# Patient Record
Sex: MALE | Race: BLACK OR AFRICAN AMERICAN | Employment: PART TIME | URBAN - METROPOLITAN AREA
[De-identification: names, ages, dates, MRNs, and addresses within clinical notes are randomized per-mention and may not be internally consistent; named-entity substitution may affect disease eponyms.]

---
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
## 2023-01-06 ENCOUNTER — OFFICE VISIT (OUTPATIENT)
Dept: PRIMARY CARE CLINIC | Age: 23
End: 2023-01-06

## 2023-01-06 VITALS
DIASTOLIC BLOOD PRESSURE: 77 MMHG | TEMPERATURE: 98.1 F | BODY MASS INDEX: 22.51 KG/M2 | WEIGHT: 160.8 LBS | HEIGHT: 71 IN | HEART RATE: 63 BPM | SYSTOLIC BLOOD PRESSURE: 124 MMHG

## 2023-01-06 DIAGNOSIS — Z11.59 NEED FOR HEPATITIS C SCREENING TEST: ICD-10-CM

## 2023-01-06 DIAGNOSIS — Z23 NEED FOR VACCINATION: ICD-10-CM

## 2023-01-06 DIAGNOSIS — M21.42 BILATERAL PES PLANUS: ICD-10-CM

## 2023-01-06 DIAGNOSIS — M21.41 BILATERAL PES PLANUS: ICD-10-CM

## 2023-01-06 DIAGNOSIS — Z00.00 ENCOUNTER FOR WELL ADULT EXAM WITHOUT ABNORMAL FINDINGS: Primary | ICD-10-CM

## 2023-01-06 DIAGNOSIS — Z11.59 NEED FOR HEPATITIS B SCREENING TEST: ICD-10-CM

## 2023-01-06 DIAGNOSIS — Z71.84 TRAVEL ADVICE ENCOUNTER: ICD-10-CM

## 2023-01-06 DIAGNOSIS — Z72.89 OTHER PROBLEMS RELATED TO LIFESTYLE: ICD-10-CM

## 2023-01-06 PROBLEM — S93.491A SPRAIN OF ANTERIOR TALOFIBULAR LIGAMENT OF RIGHT ANKLE: Status: RESOLVED | Noted: 2021-10-21 | Resolved: 2023-01-06

## 2023-01-06 LAB
HBV SURFACE AB TITR SER: <3.5 MIU/ML
HEPATITIS C ANTIBODY INTERPRETATION: NORMAL

## 2023-01-06 ASSESSMENT — ENCOUNTER SYMPTOMS
ABDOMINAL DISTENTION: 0
EYE ITCHING: 0
CHEST TIGHTNESS: 0
TROUBLE SWALLOWING: 0
DIARRHEA: 0
CONSTIPATION: 0
EYE DISCHARGE: 0
SORE THROAT: 0
BACK PAIN: 0
CHOKING: 0
ABDOMINAL PAIN: 0
SINUS PAIN: 0
RHINORRHEA: 0

## 2023-01-06 ASSESSMENT — PATIENT HEALTH QUESTIONNAIRE - PHQ9
1. LITTLE INTEREST OR PLEASURE IN DOING THINGS: 0
SUM OF ALL RESPONSES TO PHQ QUESTIONS 1-9: 6
SUM OF ALL RESPONSES TO PHQ QUESTIONS 1-9: 6
7. TROUBLE CONCENTRATING ON THINGS, SUCH AS READING THE NEWSPAPER OR WATCHING TELEVISION: 0
10. IF YOU CHECKED OFF ANY PROBLEMS, HOW DIFFICULT HAVE THESE PROBLEMS MADE IT FOR YOU TO DO YOUR WORK, TAKE CARE OF THINGS AT HOME, OR GET ALONG WITH OTHER PEOPLE: NOT DIFFICULT AT ALL
SUM OF ALL RESPONSES TO PHQ QUESTIONS 1-9: 6
9. THOUGHTS THAT YOU WOULD BE BETTER OFF DEAD, OR OF HURTING YOURSELF: 0
4. FEELING TIRED OR HAVING LITTLE ENERGY: 0
3. TROUBLE FALLING OR STAYING ASLEEP: 2
2. FEELING DOWN, DEPRESSED OR HOPELESS: 1
6. FEELING BAD ABOUT YOURSELF - OR THAT YOU ARE A FAILURE OR HAVE LET YOURSELF OR YOUR FAMILY DOWN: 1
SUM OF ALL RESPONSES TO PHQ9 QUESTIONS 1 & 2: 1
8. MOVING OR SPEAKING SO SLOWLY THAT OTHER PEOPLE COULD HAVE NOTICED. OR THE OPPOSITE, BEING SO FIGETY OR RESTLESS THAT YOU HAVE BEEN MOVING AROUND A LOT MORE THAN USUAL: 1
SUM OF ALL RESPONSES TO PHQ QUESTIONS 1-9: 6
5. POOR APPETITE OR OVEREATING: 1

## 2023-01-06 ASSESSMENT — PATIENT HEALTH QUESTIONNAIRE - GENERAL
IN THE PAST YEAR HAVE YOU FELT DEPRESSED OR SAD MOST DAYS, EVEN IF YOU FELT OKAY SOMETIMES?: NO
HAVE YOU EVER, IN YOUR WHOLE LIFE, TRIED TO KILL YOURSELF OR MADE A SUICIDE ATTEMPT?: NO
HAS THERE BEEN A TIME IN THE PAST MONTH WHEN YOU HAVE HAD SERIOUS THOUGHTS ABOUT ENDING YOUR LIFE?: NO

## 2023-01-06 NOTE — PATIENT INSTRUCTIONS
Your immunizations are up to date. We are checking to see if you have enough antibodies to hepatitis B. Try to get a typhoid vaccine before you travel:    Heart-Healthy Diet   Sodium, Fat, and Cholesterol Controlled Diet       What Is a Heart Healthy Diet? A heart-healthy diet is one that limits sodium , certain types of fat , and cholesterol . This type of diet is recommended for:   People with any form of cardiovascular disease (eg, coronary heart disease , peripheral vascular disease , previous heart attack , previous stroke )   People with risk factors for cardiovascular disease, such as high blood pressure , high cholesterol , or diabetes   Anyone who wants to lower their risk of developing cardiovascular disease   Sodium    Sodium is a mineral found in many foods. In general, most people consume much more sodium than they need. Diets high in sodium can increase blood pressure and lead to edema (water retention). On a heart-healthy diet, you should consume no more than 2,300 mg (milligrams) of sodium per dayabout the amount in one teaspoon of table salt. The foods highest in sodium include table salt (about 50% sodium), processed foods, convenience foods, and preserved foods. Cholesterol    Cholesterol is a fat-like, waxy substance in your blood. Our bodies make some cholesterol. It is also found in animal products, with the highest amounts in fatty meat, egg yolks, whole milk, cheese, shellfish, and organ meats. On a heart-healthy diet, you should limit your cholesterol intake to less than 200 mg per day. It is normal and important to have some cholesterol in your bloodstream. But too much cholesterol can cause plaque to build up within your arteries, which can eventually lead to a heart attack or stroke.    The two types of cholesterol that are most commonly referred to are:   Low-density lipoprotein (LDL) cholesterol  Also known as bad cholesterol, this is the cholesterol that tends to build up along your arteries. Bad cholesterol levels are increased by eating fats that are saturated or hydrogenated. Optimal level of this cholesterol is less than 100. Over 130 starts to get risky for heart disease. High-density lipoprotein (HDL) cholesterol  Also known as good cholesterol, this type of cholesterol actually carries cholesterol away from your arteries and may, therefore, help lower your risk of having a heart attack. You want this level to be high (ideally greater than 60). It is a risk to have a level less than 40. You can raise this good cholesterol by eating olive oil, canola oil, avocados, or nuts. Exercise raises this level, too. Fat    Fat is calorie dense and packs a lot of calories into a small amount of food. Even though fats should be limited due to their high calorie content, not all fats are bad. In fact, some fats are quite healthful. Fat can be broken down into four main types.    The good-for-you fats are:   Monounsaturated fat  found in oils such as olive and canola, avocados, and nuts and natural nut butters; can decrease cholesterol levels, while keeping levels of HDL cholesterol high   Polyunsaturated fat  found in oils such as safflower, sunflower, soybean, corn, and sesame; can decrease total cholesterol and LDL cholesterol   Omega-3 fatty acids  particularly those found in fatty fish (such as salmon, trout, tuna, mackerel, herring, and sardines); can decrease risk of arrhythmias, decrease triglyceride levels, and slightly lower blood pressure   The fats that you want to limit are:   Saturated fat  found in animal products, many fast foods, and a few vegetables; increases total blood cholesterol, including LDL levels   Animal fats that are saturated include: butter, lard, whole-milk dairy products, meat fat, and poultry skin   Vegetable fats that are saturated include: hydrogenated shortening, palm oil, coconut oil, cocoa butter   Hydrogenated or trans fat  found in margarine and vegetable shortening, most shelf stable snack foods, and fried foods; increases LDL and decreases HDL     It is generally recommended that you limit your total fat for the day to less than 30% of your total calories. If you follow an 1800-calorie heart healthy diet, for example, this would mean 60 grams of fat or less per day. Saturated fat and trans fat in your diet raises your blood cholesterol the most, much more than dietary cholesterol does. For this reason, on a heart-healthy diet, less than 7% of your calories should come from saturated fat and ideally 0% from trans fat. On an 1800-calorie diet, this translates into less than 14 grams of saturated fat per day, leaving 46 grams of fat to come from mono- and polyunsaturated fats.    Food Choices on a Heart Healthy Diet   Food Category   Foods Recommended   Foods to Avoid   Grains   Breads and rolls without salted tops Most dry and cooked cereals Unsalted crackers and breadsticks Low-sodium or homemade breadcrumbs or stuffing All rice and pastas   Breads, rolls, and crackers with salted tops High-fat baked goods (eg, muffins, donuts, pastries) Quick breads, self-rising flour, and biscuit mixes Regular bread crumbs Instant hot cereals Commercially prepared rice, pasta, or stuffing mixes   Vegetables   Most fresh, frozen, and low-sodium canned vegetables Low-sodium and salt-free vegetable juices Canned vegetables if unsalted or rinsed   Regular canned vegetables and juices, including sauerkraut and pickled vegetables Frozen vegetables with sauces Commercially prepared potato and vegetable mixes   Fruits   Most fresh, frozen, and canned fruits All fruit juices   Fruits processed with salt or sodium   Milk   Nonfat or low-fat (1%) milk Nonfat or low-fat yogurt Cottage cheese, low-fat ricotta, cheeses labeled as low-fat and low-sodium   Whole milk Reduced-fat (2%) milk Malted and chocolate milk Full fat yogurt Most cheeses (unless low-fat and low salt) Buttermilk (no more than 1 cup per week)   Meats and Beans   Lean cuts of fresh or frozen beef, veal, lamb, or pork (look for the word loin) Fresh or frozen poultry without the skin Fresh or frozen fish and some shellfish Egg whites and egg substitutes (Limit whole eggs to three per week) Tofu Nuts or seeds (unsalted, dry-roasted), low-sodium peanut butter Dried peas, beans, and lentils   Any smoked, cured, salted, or canned meat, fish, or poultry (including sheets, chipped beef, cold cuts, hot dogs, sausages, sardines, and anchovies) Poultry skins Breaded and/or fried fish or meats Canned peas, beans, and lentils Salted nuts   Fats and Oils   Olive oil and canola oil Low-sodium, low-fat salad dressings and mayonnaise   Butter, margarine, coconut and palm oils, sheets fat   Snacks, Sweets, and Condiments   Low-sodium or unsalted versions of broths, soups, soy sauce, and condiments Pepper, herbs, and spices; vinegar, lemon, or lime juice Low-fat frozen desserts (yogurt, sherbet, fruit bars) Sugar, cocoa powder, honey, syrup, jam, and preserves Low-fat, trans-fat free cookies, cakes, and pies Clayton and animal crackers, fig bars, jayesh snaps   High-fat desserts Broth, soups, gravies, and sauces, made from instant mixes or other high-sodium ingredients Salted snack foods Canned olives Meat tenderizers, seasoning salt, and most flavored vinegars   Beverages   Low-sodium carbonated beverages Tea and coffee in moderation Soy milk   Commercially softened water   Suggestions   Make whole grains, fruits, and vegetables the base of your diet. Choose heart-healthy fats such as canola, olive, and flaxseed oil, and foods high in heart-healthy fats, such as nuts, seeds, soybeans, tofu, and fish. Eat fish at least twice per week; the fish highest in omega-3 fatty acids and lowest in mercury include salmon, herring, mackerel, sardines, and canned chunk light tuna.  If you eat fish less than twice per week or have high triglycerides, talk to your doctor about taking fish oil supplements. Read food labels. For products low in fat and cholesterol, look for fat free, low-fat, cholesterol free, saturated fat free, and trans fat freeAlso scan the Nutrition Facts Label, which lists saturated fat, trans fat, and cholesterol amounts. For products low in sodium, look for sodium free, very low sodium, low sodium, no added salt, and unsalted   Skip the salt when cooking or at the table; if food needs more flavor, get creative and try out different herbs and spices. Garlic and onion also add substantial flavor to foods. Trim any visible fat off meat and poultry before cooking, and drain the fat off after gutierrez. Use cooking methods that require little or no added fat, such as grilling, boiling, baking, poaching, broiling, roasting, steaming, stir-frying, and sauting. Avoid fast food and convenience food. They tend to be high in saturated and trans fat and have a lot of added salt. Talk to a registered dietitian for individualized diet advice.       Last Reviewed: March 2011 Markell Simpson MS, MPH, RD   Updated: 3/29/2011

## 2023-01-06 NOTE — LETTER
[facility]  [address] [address]  Phone: [phone]    Reinier Spangler MD    January 6, 2023     Patient: Tuan Ann   YOB: 2000   Date of Visit: 1/6/2023       Immunization History   Administered Date(s) Administered    COVID-19, MODERNA Bivalent BOOSTER, (age 12y+), IM, 48 mcg/0.5 mL 11/19/2022    COVID-19, PFIZER PURPLE top, DILUTE for use, (age 15 y+), 30mcg/0.3mL 04/08/2021, 04/29/2021    DTaP 2002, 03/01/2003, 04/18/2003, 07/15/2004, 06/18/2007    HPV Quadrivalent (Gardasil) 08/07/2014, 10/09/2015    Hepatitis A 08/24/2006, 06/18/2007    Hepatitis B 2000, 2000, 12/06/2003    Hib (HbOC) 2002, 03/01/2003, 04/18/2003, 12/06/2003    Influenza A (T1O9-91) Vaccine Nasal 11/10/2009, 12/14/2009    Influenza Nasal 02/02/2011, 10/04/2011, 11/12/2012    Influenza Virus Vaccine 11/19/2022    Influenza, AFLURIA (age 1 yrs+), FLUZONE, (age 10 mo+), MDV, 0.5mL 10/17/2016, 10/13/2017    Influenza, FLUBLOK, (age 25 y+), PF, 0.5mL 10/23/2020    Influenza, FLUMIST, (age 2-51 y), Live, Intranasal, 0.2mL 11/12/2013, 10/10/2014, 10/09/2015    MMR 12/06/2003, 06/18/2007    Meningococcal B, OMV (Bexsero) 01/06/2023    Meningococcal B, Recombinant Martinez Laser) 07/18/2019    Meningococcal MCV4P (Menactra) 11/12/2013, 07/18/2019    Pneumococcal Conjugate 7-valent (Sammye Grigsby) 2002, 03/01/2003, 12/06/2003    Polio IPV (IPOL) 2002, 03/01/2003, 07/15/2004, 06/18/2007    Tdap (Boostrix, Adacel) 11/12/2013, 01/06/2023    Varicella (Varivax) 12/06/2003, 07/14/2008

## 2023-01-06 NOTE — PROGRESS NOTES
Well Adult Note  Name: Emmanuel Johns Date: 2023   MRN: [id number] Sex: Male   Age: 23 y.o. Ethnicity: Non- / Non    : 2000 Race: Black / Landon Bee is here for well adult exam. This is his first visit with our primary care office since 2019. 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 He did PT but has not done recent stretching or strengthening of ankles. Other than that, he has not had recent LE pain or injury. He says he feels a little unsteady when he comes down from a jump    History:  Past history reviewed  He has no health risk factors. Diet is generally healthy      Review of Systems   Constitutional:  Negative for fatigue and fever. HENT:  Negative for congestion, dental problem, ear pain, nosebleeds, rhinorrhea, sinus pain, sore throat and trouble swallowing. Eyes:  Negative for discharge and itching. Respiratory:  Negative for choking and chest tightness. Cardiovascular:  Negative for chest pain, palpitations and leg swelling. Gastrointestinal:  Negative for abdominal distention, abdominal pain, constipation and diarrhea. Genitourinary:  Negative for dysuria, frequency, hematuria, penile discharge, scrotal swelling and testicular pain. Musculoskeletal:  Negative for arthralgias, back pain, gait problem, joint swelling, myalgias, neck pain and neck stiffness. Skin:  Negative for rash. Allergic/Immunologic: Negative for environmental allergies and food allergies. Neurological:  Negative for dizziness, syncope and headaches. Hematological:  Negative for adenopathy. Does not bruise/bleed easily.    Psychiatric/Behavioral:  Negative for decreased concentration, hallucinations, sleep disturbance and suicidal ideas. The patient is not nervous/anxious. No Known Allergies    No current outpatient medications on file prior to visit. No current facility-administered medications on file prior to visit. Past Medical History:   Diagnosis Date    Open wound of forehead 07/02/2019    Sprain of anterior talofibular ligament of right ankle 10/21/2021       No past surgical history on file. Family History   Problem Relation Age of Onset    Asthma Mother     Asthma Brother     Cancer Paternal Grandmother     Diabetes Other        Social History     Tobacco Use    Smoking status: Never    Smokeless tobacco: Never   Vaping Use    Vaping Use: Never used   Substance Use Topics    Alcohol use: Never    Drug use: Never       Objective   /77 (Site: Right Upper Arm, Position: Sitting, Cuff Size: Medium Adult)   Pulse 63   Temp 98.1 °F (36.7 °C) (Infrared)   Ht 5' 10.5\" (1.791 m)   Wt 160 lb 12.8 oz (72.9 kg)   BMI 22.75 kg/m²   Wt Readings from Last 3 Encounters:   01/06/23 160 lb 12.8 oz (72.9 kg)   11/11/21 150 lb (68 kg) (45 %, Z= -0.11)*   10/21/21 150 lb (68 kg) (46 %, Z= -0.11)*     * Growth percentiles are based on CDC (Boys, 2-20 Years) data. There were no vitals filed for this visit. Physical Exam  Vitals reviewed. Constitutional:       General: He is not in acute distress. Appearance: Normal appearance. He is well-developed and normal weight. HENT:      Head: Normocephalic and atraumatic. Right Ear: External ear normal.      Left Ear: External ear normal.      Nose: Nose normal.      Mouth/Throat:      Pharynx: No oropharyngeal exudate. Eyes:      General: No scleral icterus. Right eye: No discharge. Left eye: No discharge. Conjunctiva/sclera: Conjunctivae normal.      Pupils: Pupils are equal, round, and reactive to light. Neck:      Thyroid: No thyromegaly. Trachea: No tracheal deviation.    Cardiovascular:      Rate and Rhythm: Normal rate and regular rhythm. Heart sounds: Normal heart sounds. No murmur heard. No friction rub. No gallop. Pulmonary:      Effort: Pulmonary effort is normal.      Breath sounds: Normal breath sounds. No wheezing or rales. Abdominal:      General: Bowel sounds are normal. There is no distension. Palpations: Abdomen is soft. There is no mass. Tenderness: There is no abdominal tenderness. There is no guarding or rebound. Hernia: No hernia is present. There is no hernia in the left inguinal area or right inguinal area. Genitourinary:     Comments: Not examined  Musculoskeletal:         General: Normal range of motion. Cervical back: Normal range of motion and neck supple. Comments: There is normal ROM of the ankle joint bilaterally. There is no pain or instability. He has bilat pes planus   Lymphadenopathy:      Cervical: No cervical adenopathy. Lower Body: No right inguinal adenopathy. No left inguinal adenopathy. Skin:     General: Skin is warm and dry. Capillary Refill: Capillary refill takes less than 2 seconds. Findings: No erythema or rash. Neurological:      Mental Status: He is alert and oriented to person, place, and time. Cranial Nerves: No cranial nerve deficit. Motor: No abnormal muscle tone. Coordination: Coordination normal.   Psychiatric:         Mood and Affect: Mood normal.         Behavior: Behavior normal.         Thought Content: Thought content normal.         Judgment: Judgment normal.       Assessment   Plan   1. Encounter for well adult exam without abnormal findings  Overall, Jaye Antoine is doing very well in academic/social/behavioral areas. He is okay for study abroad program. He has a healthy lifestyle, with no chronic problems  He had normal lipid, HIV, and CBC   2. Other problems related to lifestyle  -     Hepatitis C Antibody- this is a screening test only, as he has no risk factors  3.  Need for hepatitis C screening test  -     Hepatitis C Antibody- see above  4. BMI 22.0-22.9, adult  He has very little body fat, consistent with his athletic status, could eat more  5. Bilateral pes planus  Ordinarily this should cause no problem, but due to possible ankle instability I have given Nosagie information about preventing foot problems and ankle strengthening exercises  6. Need for vaccination  Nosagie had influenza and covid vaccinations elsewhere. I have updated the following vaccines:  -     Tdap, BOOSTRIX, (age 8 yrs+), IM  -     Meningococcal Shanel Downy, (age 6y-22y), IM  7. Need for hepatitis B screening test  -     Hepatitis B Surface Antibody - may need hep B booster before travel if titer is low  8. Travel advice encounter  In addition to the other vaccines, he needs typhoid vaccine acc to CDC travel guidelines  Our staff confirmed that [facility] [address] in [place] has the oral typhoid vaccine, so Rx was sent there and Nosagie confirmed that he will pick this up before he leaves. -     typhoid vaccine (VIVOTIF) delayed release capsule; Take one capsule by mouth immediately.  Repeat dose in 48 hours, Disp-2 capsule, R-0Normal         Personalized Preventive Plan   Current Health Maintenance Status  Immunization History   Administered Date(s) Administered    COVID-19, MODERNA Bivalent BOOSTER, (age 12y+), IM, 48 mcg/0.5 mL 11/19/2022    COVID-19, PFIZER PURPLE top, DILUTE for use, (age 15 y+), 30mcg/0.3mL 04/08/2021, 04/29/2021    DTaP 2002, 03/01/2003, 04/18/2003, 07/15/2004, 06/18/2007    HPV Quadrivalent (Gardasil) 08/07/2014, 10/09/2015    Hepatitis A 08/24/2006, 06/18/2007    Hepatitis B 2002, 2002, 12/06/2003    Hib (HbOC) 2002, 03/01/2003, 04/18/2003, 12/06/2003    Influenza A (M4E2-68) Vaccine Nasal 11/10/2009, 12/14/2009    Influenza Nasal 02/02/2011, 10/04/2011, 11/12/2012    Influenza Virus Vaccine 11/19/2022    Influenza, AFLURIA (age 1 yrs+), FLUZONE, (age 10 mo+), MDV, 0.5mL 10/17/2016, 10/13/2017    Influenza, FLUBLOK, (age 25 y+), PF, 0.5mL 10/23/2020    Influenza, FLUMIST, (age 2-51 y), Live, Intranasal, 0.2mL 11/12/2013, 10/10/2014, 10/09/2015    MMR 12/06/2003, 06/18/2007    Meningococcal B, Recombinant Prvain Boyd) 07/18/2019    Meningococcal MCV4P (Menactra) 11/12/2013, 07/18/2019    Pneumococcal Conjugate 7-valent (Joanette Gisela) 2002, 03/01/2003, 12/06/2003    Polio IPV (IPOL) 2002, 03/01/2003, 07/15/2004, 06/18/2007    Tdap (Boostrix, Adacel) 11/12/2013    Varicella (Varivax) 12/06/2003, 07/14/2008      This is current state of  items due:  Health Maintenance   Topic Date Due    Depression Screen  Never done    Hepatitis C screen  Never done    DTaP/Tdap/Td vaccine (8 - Td or Tdap) 01/06/2033    Hepatitis A vaccine  Completed    Hib vaccine  Completed    HPV vaccine  Completed    Varicella vaccine  Completed    Meningococcal (ACWY) vaccine  Completed    Flu vaccine  Completed    COVID-19 Vaccine  Completed    HIV screen  Completed    Pneumococcal 0-64 years Vaccine  Aged Out     Recommendations for Preventive Services Due: see orders and patient instructions/AVS.    No follow-ups on file.

## 2023-01-07 ENCOUNTER — TELEPHONE (OUTPATIENT)
Dept: PRIMARY CARE CLINIC | Age: 23
End: 2023-01-07

## 2023-01-07 NOTE — PROGRESS NOTES
Well Adult Note  Name: Jose Fitzpatrick Date: 2023   MRN: [id number] Sex: Male   Age: 23 y.o. Ethnicity: Non- / Non    : 2000 Race: Black / Sophia Megan is here for well adult exam. This is his first visit with our primary care office since 2019. 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 He did PT but has not done recent stretching or strengthening of ankles. Other than that, he has not had recent LE pain or injury. He says he feels a little unsteady when he comes down from a jump    History:  Past history reviewed  He has no health risk factors. Diet is generally healthy      Review of Systems   Constitutional:  Negative for fatigue and fever. HENT:  Negative for congestion, dental problem, ear pain, nosebleeds, rhinorrhea, sinus pain, sore throat and trouble swallowing. Eyes:  Negative for discharge and itching. Respiratory:  Negative for choking and chest tightness. Cardiovascular:  Negative for chest pain, palpitations and leg swelling. Gastrointestinal:  Negative for abdominal distention, abdominal pain, constipation and diarrhea. Genitourinary:  Negative for dysuria, frequency, hematuria, penile discharge, scrotal swelling and testicular pain. Musculoskeletal:  Negative for arthralgias, back pain, gait problem, joint swelling, myalgias, neck pain and neck stiffness. Skin:  Negative for rash. Allergic/Immunologic: Negative for environmental allergies and food allergies. Neurological:  Negative for dizziness, syncope and headaches. Hematological:  Negative for adenopathy. Does not bruise/bleed easily.    Psychiatric/Behavioral:  Negative for decreased concentration, hallucinations, sleep disturbance and suicidal ideas. The patient is not nervous/anxious. No Known Allergies    No current outpatient medications on file prior to visit. No current facility-administered medications on file prior to visit. Past Medical History:   Diagnosis Date    Open wound of forehead 07/02/2019    Sprain of anterior talofibular ligament of right ankle 10/21/2021       No past surgical history on file. Family History   Problem Relation Age of Onset    Asthma Mother     Asthma Brother     Cancer Paternal Grandmother     Diabetes Other        Social History     Tobacco Use    Smoking status: Never    Smokeless tobacco: Never   Vaping Use    Vaping Use: Never used   Substance Use Topics    Alcohol use: Never    Drug use: Never       Objective   /77 (Site: Right Upper Arm, Position: Sitting, Cuff Size: Medium Adult)   Pulse 63   Temp 98.1 °F (36.7 °C) (Infrared)   Ht 5' 10.5\" (1.791 m)   Wt 160 lb 12.8 oz (72.9 kg)   BMI 22.75 kg/m²   Wt Readings from Last 3 Encounters:   01/06/23 160 lb 12.8 oz (72.9 kg)   11/11/21 150 lb (68 kg) (45 %, Z= -0.11)*   10/21/21 150 lb (68 kg) (46 %, Z= -0.11)*     * Growth percentiles are based on CDC (Boys, 2-20 Years) data. There were no vitals filed for this visit. Physical Exam  Vitals reviewed. Constitutional:       General: He is not in acute distress. Appearance: Normal appearance. He is well-developed and normal weight. HENT:      Head: Normocephalic and atraumatic. Right Ear: External ear normal.      Left Ear: External ear normal.      Nose: Nose normal.      Mouth/Throat:      Pharynx: No oropharyngeal exudate. Eyes:      General: No scleral icterus. Right eye: No discharge. Left eye: No discharge. Conjunctiva/sclera: Conjunctivae normal.      Pupils: Pupils are equal, round, and reactive to light. Neck:      Thyroid: No thyromegaly. Trachea: No tracheal deviation.    Cardiovascular:      Rate and Rhythm: Normal rate and regular rhythm. Heart sounds: Normal heart sounds. No murmur heard. No friction rub. No gallop. Pulmonary:      Effort: Pulmonary effort is normal.      Breath sounds: Normal breath sounds. No wheezing or rales. Abdominal:      General: Bowel sounds are normal. There is no distension. Palpations: Abdomen is soft. There is no mass. Tenderness: There is no abdominal tenderness. There is no guarding or rebound. Hernia: No hernia is present. There is no hernia in the left inguinal area or right inguinal area. Genitourinary:     Comments: Not examined  Musculoskeletal:         General: Normal range of motion. Cervical back: Normal range of motion and neck supple. Comments: There is normal ROM of the ankle joint bilaterally. There is no pain or instability. He has bilat pes planus   Lymphadenopathy:      Cervical: No cervical adenopathy. Lower Body: No right inguinal adenopathy. No left inguinal adenopathy. Skin:     General: Skin is warm and dry. Capillary Refill: Capillary refill takes less than 2 seconds. Findings: No erythema or rash. Neurological:      Mental Status: He is alert and oriented to person, place, and time. Cranial Nerves: No cranial nerve deficit. Motor: No abnormal muscle tone. Coordination: Coordination normal.   Psychiatric:         Mood and Affect: Mood normal.         Behavior: Behavior normal.         Thought Content: Thought content normal.         Judgment: Judgment normal.       Assessment   Plan   1. Encounter for well adult exam without abnormal findings  Overall, Jaye Fitzgerald is doing very well in academic/social/behavioral areas. He is okay for study abroad program. He has a healthy lifestyle, with no chronic problems  He had normal lipid, HIV, and CBC 3 yrs ago, and with no change in weight or lifestyle, he does not need these repeated today  2.  Other problems related to lifestyle  -     Hepatitis C Antibody- this is a screening test only, as he has no risk factors  3. Need for hepatitis C screening test  -     Hepatitis C Antibody- see above  4. BMI 22.0-22.9, adult  He has very little body fat, consistent with his athletic status, but he should eat more  5. Bilateral pes planus  Ordinarily this should cause no problem, but due to possible ankle instability I have given Jaye information about preventing foot problems and ankle strengthening exercises  6. Need for vaccination  Jaye had influenza and covid vaccinations elsewhere. I have updated the following vaccines in anticipation of travel:  -     Tdap, BOOSTRIX, (age 8 yrs+), IM  -     Meningococcal Shanel Downy, (age 6y-22y), IM  7. Need for hepatitis B screening test  -     Hepatitis B Surface Antibody - may need hep B booster before travel if titer is low  8. Travel advice encounter  In addition to the other vaccines, he needs typhoid vaccine acc to CDC travel guidelines  Our staff confirmed that [facility] on [address] in [place] has the oral typhoid vaccine, so Rx was sent there and Fauziamilylois confirmed that he will pick this up before he leaves. -     typhoid vaccine (VIVOTIF) delayed release capsule; Take one capsule by mouth immediately.  Repeat dose in 48 hours, Disp-2 capsule, R-0Normal         Personalized Preventive Plan   Current Health Maintenance Status  Immunization History   Administered Date(s) Administered    COVID-19, MODERNA Bivalent BOOSTER, (age 12y+), IM, 48 mcg/0.5 mL 11/19/2022    COVID-19, PFIZER PURPLE top, DILUTE for use, (age 15 y+), 30mcg/0.3mL 04/08/2021, 04/29/2021    DTaP 2002, 03/01/2003, 04/18/2003, 07/15/2004, 06/18/2007    HPV Quadrivalent (Gardasil) 08/07/2014, 10/09/2015    Hepatitis A 08/24/2006, 06/18/2007    Hepatitis B 2002, 2002, 12/06/2003    Hib (HbOC) 2002, 03/01/2003, 04/18/2003, 12/06/2003    Influenza A ([id number]) Vaccine Nasal 11/10/2009, 12/14/2009    Influenza Nasal 02/02/2011, 10/04/2011, 11/12/2012    Influenza Virus Vaccine 11/19/2022    Influenza, AFLURIA (age 1 yrs+), FLUZONE, (age 10 mo+), MDV, 0.5mL 10/17/2016, 10/13/2017    Influenza, FLUBLOK, (age 25 y+), PF, 0.5mL 10/23/2020    Influenza, FLUMIST, (age 2-51 y), Live, Intranasal, 0.2mL 11/12/2013, 10/10/2014, 10/09/2015    MMR 12/06/2003, 06/18/2007    Meningococcal B, Recombinant Heath Reginamaul) 07/18/2019    Meningococcal MCV4P (Menactra) 11/12/2013, 07/18/2019    Pneumococcal Conjugate 7-valent (Jones Labrum) 2002, 03/01/2003, 12/06/2003    Polio IPV (IPOL) 2002, 03/01/2003, 07/15/2004, 06/18/2007    Tdap (Boostrix, Adacel) 11/12/2013    Varicella (Varivax) 12/06/2003, 07/14/2008          Recommendations for Preventive Services Due: see orders and patient instructions/AVS.    Nosagie should return for another visit if he has health problems when he comes back.